# Patient Record
Sex: FEMALE | Race: WHITE | NOT HISPANIC OR LATINO | Employment: FULL TIME | URBAN - METROPOLITAN AREA
[De-identification: names, ages, dates, MRNs, and addresses within clinical notes are randomized per-mention and may not be internally consistent; named-entity substitution may affect disease eponyms.]

---

## 2017-09-14 ENCOUNTER — ALLSCRIPTS OFFICE VISIT (OUTPATIENT)
Dept: OTHER | Facility: OTHER | Age: 37
End: 2017-09-14

## 2017-10-26 NOTE — PROGRESS NOTES
Assessment  1  Rosacea (695 3) (L71 9)   2  Hyperhidrosis (705 21) (L74 964)    Plan   · Drysol 20 % External Solution; APPLY SPARINGLY TO AFFECTED AREA EVERY  NIGHT BEFORE BED   · Finacea 15 % External Foam; APPLY TO FACE DAILY   · Mirvaso 0 33 % External Gel; Apply to face qam   · Oracea 40 MG Oral Capsule Delayed Release; TAKE 1 CAPSULE DAILY EVERY  MORNING BEFORE BREAKFAST   · Follow-up visit in 6 months Evaluation and Treatment  Follow-up  Status: Hold For -  Scheduling  Requested for: [id number]    Discussion/Summary  Discussion Summary- [facility] Derm:   Assessment #1: Rosacea  Care Plan:   Under great control continue same therapy will consider stopping Oracea at her next visit to see if this will continue to be necessary for control continue with Mirvaso gel and Finacea  Assessment #2: Hyperhidrosis  Care Plan:   Under good control continue same therapy  Chief Complaint  Chief Complaint Free Text Note Form: 6 month check up for Rosacea      History of Present Illness  HPI: 60-year-old female with history of both rosacea and hyperhidrosis seen in follow-up  Patient notes both problems are under good control using all the medications as prescribed with good results  No specific concerns noted      Review of Systems  Complete Female Dermatology [facility] Patient:   Constitutional: Denies constitutional symptoms  Eyes: Denies eye symptoms  ENT:  denies ear symptoms, nasal symptoms, mouth or throat symptoms  Cardiovascular: Denies cardiovascular symptoms  Respiratory: Denies respiratory symptoms  Gastrointestinal: Denies gastrointestinal symptoms  Musculoskeletal: Denies musculoskeletal symptoms  Integumentary: Denies skin, hair and nail symptoms  Neurological: Denies neurologic symptoms  Psychiatric: Denies psychiatric symptoms  Endocrine: Denies endocrine symptoms  Hematologic/Lymphatic: Denies hematologic symptoms  Active Problems  1   Hyperhidrosis (705 21) (L01 747) 2  Rosacea (695 3) (L71 9)   3  Screening for skin condition (V82 0) (Z13 89)    Past Medical History  Past Medical History Reviewed- Derm:   The past medical history was reviewed  Surgical History  1  History of  Section  Surgical History Reviewed [address] [place] [address] [address]- Derm:   Surgical History reviewed      Family History  Father    1  Family history of transient ischemic attacks (V17 1) (Z82 3)  Family History Reviewed- Derm:   Family History was reviewed      Social History   · Never a smoker  Social History Reviewed [facility]- Derm: The social history was reviewed      Current Meds   1  Drysol 20 % External Solution; APPLY SPARINGLY TO AFFECTED AREA EVERY NIGHT   BEFORE BED; Therapy: 22GFA4528 to (Last Rx:2016)  Requested for: 2016 Ordered   2  Finacea 15 % External Foam; APPLY TO FACE DAILY; Therapy: 07LIP1622 to (Last Rx:33Hjh1569)  Requested for: 46Kog8577 Ordered   3  MetroNIDAZOLE 1 % External Gel; apply to affected area sparingly once daily; Therapy: 86VSU8371 to (Last MT:[id number])  Requested for: 81HDB8697 Ordered   4  Mirvaso 0 33 % External Gel; Apply to face qam  Requested for: 01Nig2986; Last   Rx:47Hkt1136 Ordered   5  Mupirocin 2 % External Ointment; Therapy: (Elsie Victor to Recorded   6  Oracea 40 MG Oral Capsule Delayed Release; TAKE 1 CAPSULE DAILY EVERY   MORNING BEFORE BREAKFAST; Therapy: 01XXD2250 to (Evaluate:2017)  Requested for: 32Yuo8264; Last   Rx:[id number] Ordered  Medication List Reviewed: The medication list was reviewed and updated today  Allergies  1  morphine    Physical Exam    Constitutional   General appearance: Appears healthy and well developed  Lymphatic   No visible disturbance  Musculoskeletal   Digits and nails: No clubbing, cyanosis or edema  Cutaneous and nail exam normal     Skin   Scalp skin texture and hair distribution: Normal skin texture on scalp, normal hair distribution  Head: Normal turgor, no rashes, no lesions  Neuro/Psych   Alert and oriented x 3  Displays comfort and cooperation during encounterl  Affect is normal     Finding Minimal erythema no papules marked improvement noted  Future Appointments    Date/Time Provider Specialty Site   03/16/2018 08:00 AM FAMILIA Rosado   Dermatology [facility]     Signatures   Electronically signed by : FAMILIA Soliman ; Sep 14 2017  8:53AM EST                       (Author)

## 2018-01-17 NOTE — PROGRESS NOTES
Assessment    1  Rosacea (695 3) (L71 9)   2  Screening for skin condition (V82 0) (Z13 89)   3  Hyperhidrosis (705 21) (L74 519)    Plan    · Drysol 20 % External Solution; APPLY SPARINGLY TO AFFECTED AREA EVERY  NIGHT BEFORE BED    · MetroNIDAZOLE 1 % External Gel; apply to affected area sparingly once daily   · Oracea 40 MG Oral Capsule Delayed Release; TAKE 1 CAPSULE DAILY EVERY  MORNING BEFORE BREAKFAST   · From  Mirvaso 0 33 % External Gel  To Mirvaso 0 33 % External Gel Apply to  face qam   · Follow-up visit in 3 months Evaluation and Treatment  Follow-up  Status: Complete   Done: 09DQN8800    Discussion/Summary  Discussion Summary- [facility] Derm:   Assessment #1: Rosacea  Care Plan: With no improvement noted with Finacea we switch patient to use the MetroGel along with her Mirvaso gel on a more consistent basis  We also will add Oracea to her regimen reevaluate in 3 months also consider referral for laser treatment  Assessment #2: Hyperhidrosis  Care Plan:   We discussed the use of aluminum chloride prescription given  Chief Complaint  Chief Complaint Free Text Note Form: ROSACEA TREATMENT FUP  C/O EXCESSIVE SWEATING UNDER BOTH ARMS  History of Present Illness  HPI: 43-year-old female seen in followup secondary to rosacea as well as concerns regarding hyperhidrosis of her axilla which is been a problem for a while  Patient has not responded to any topical therapies over-the-counter  Her rosacea has not improved with use of Finacea on a consistent basis over the last 3 months      Review of Systems  Complete Female Dermatology [facility] Patient:   Constitutional: Denies constitutional symptoms  Eyes: Denies eye symptoms  ENT:  denies ear symptoms, nasal symptoms, mouth or throat symptoms  Cardiovascular: Denies cardiovascular symptoms  Respiratory: Denies respiratory symptoms  Gastrointestinal: Denies gastrointestinal symptoms     Musculoskeletal: Denies musculoskeletal symptoms  Integumentary: Denies skin, hair and nail symptoms  Neurological: Denies neurologic symptoms  Psychiatric: Denies psychiatric symptoms  Endocrine: Denies endocrine symptoms  Hematologic/Lymphatic: Denies hematologic symptoms  Active Problems    1  Rosacea (695 3) (L71 9)   2  Screening for skin condition (V82 0) (Z13 89)    Past Medical History  Past Medical History Reviewed- Derm:   The past medical history was reviewed  Surgical History    1  History of  Section  Surgical History Reviewed [address] [place] [address] 14- Derm:   Surgical History reviewed      Family History    1  Family history of transient ischemic attacks (V17 1) (Z82 3)  Family History Reviewed- Derm:   Family History was reviewed      Social History    · Never a smoker  Social History Reviewed [facility]- Derm: The social history was reviewed      Current Meds   1  Mirvaso 0 33 % External Gel Recorded   2  Mupirocin 2 % External Ointment; Therapy: (Recorded:77Ufc8498) to Recorded  Medication List Reviewed: The medication list was reviewed and updated today  Allergies    1  morphine    Physical Exam    Constitutional   General appearance: Appears healthy and well developed  Lymphatic   No visible disturbance  Musculoskeletal   Digits and nails: No clubbing, cyanosis or edema  Cutaneous and nail exam normal     Skin   Scalp skin texture and hair distribution: Normal skin texture on scalp, normal hair distribution  Head: Abnormal     Neuro/Psych   Alert and oriented x 3  Displays comfort and cooperation during encounterl  Affect is normal     Finding No apparent hyperhidrosis noted erythema papules pustules noted as previously and improvement noted  Future Appointments    Date/Time Provider Specialty Site   2016 04:05 PM FAMILIA Benton   Dermatology [facility]     Signatures   Electronically signed by : FAMILIA Mata ; 2016  5:39PM EST (Author)

## 2018-01-18 ENCOUNTER — GENERIC CONVERSION - ENCOUNTER (OUTPATIENT)
Dept: OTHER | Facility: OTHER | Age: 38
End: 2018-01-18

## 2018-02-18 DIAGNOSIS — L71.9 ROSACEA: Primary | ICD-10-CM

## 2018-03-19 ENCOUNTER — TELEPHONE (OUTPATIENT)
Dept: INTERNAL MEDICINE CLINIC | Facility: CLINIC | Age: 38
End: 2018-03-19

## 2018-03-19 DIAGNOSIS — L71.9 ROSACEA: Primary | ICD-10-CM

## 2018-03-19 NOTE — TELEPHONE ENCOUNTER
Pt resched her appt to 03/29, 1 week later  Will run out of [facility]    Would like a 1 month rx sent to [facility] french     Pt would like a call back upon completion

## 2018-03-28 RX ORDER — METRONIDAZOLE 10 MG/G
GEL TOPICAL
COMMUNITY
Start: 2016-01-26 | End: 2018-03-29 | Stop reason: ALTCHOICE

## 2018-03-29 ENCOUNTER — OFFICE VISIT (OUTPATIENT)
Dept: DERMATOLOGY | Facility: CLINIC | Age: 38
End: 2018-03-29
Payer: COMMERCIAL

## 2018-03-29 DIAGNOSIS — L71.9 ROSACEA: Primary | ICD-10-CM

## 2018-03-29 DIAGNOSIS — R61 HYPERHIDROSIS: ICD-10-CM

## 2018-03-29 PROCEDURE — 99213 OFFICE O/P EST LOW 20 MIN: CPT | Performed by: DERMATOLOGY

## 2018-03-29 NOTE — PATIENT INSTRUCTIONS
Rosacea under good control again advised patient to consider discontinue doxycycline continue topicals   hyperhidrosis under control  Continue same therapy

## 2018-03-29 NOTE — PROGRESS NOTES
[address] [facility]  [address] [address]     MRN: [id number] : 1980  Encounter: [id number]  Patient Information: Yordan Pritchett  Chief complaint: follow-up for rosacea and hyperhidrosis    History of present illness:  64-year-old female presents for follow-up for both rosacea and hyperhidrosis doing well for both conditions  Patient notably is reluctant to stop her doxycycline because it for fear of this recurring  At present no problems noted hyperhidrosis under good control  No past medical history on file  No past surgical history on file  Social History   History   Alcohol use Not on file     History   Drug use: Unknown     History   Smoking Status    Not on file   Smokeless Tobacco    Not on file     No family history on file  Meds/Allergies   Allergies   Allergen Reactions    Morphine        Meds:  Prior to Admission medications    Medication Sig Start Date End Date Taking?  Authorizing Provider   aluminum chloride (DRYSOL) 20 % external solution Apply topically 16  Yes Historical Provider, MD   Azelaic Acid (FINACEA) 15 % FOAM Apply topically daily 16  Yes Historical Provider, MD   Brimonidine Tartrate (MIRVASO) 0 33 % GEL Apply topically   Yes Historical Provider, MD   ORACEA 40 MG capsule Take 1 capsule (40 mg total) by mouth daily before breakfast 3/19/18  Yes Jan Mendez MD   metroNIDAZOLE (METROGEL) 1 % gel Apply topically 16   Historical Provider, MD   Mupirocin 2 % KIT Apply topically    Historical Provider, MD       Subjective:     Review of Systems:    General: negative for - chills, fatigue, fever,  weight gain or weight loss  Psychological: negative for - anxiety, behavioral disorder, concentration difficulties, decreased libido, depression, irritability, memory difficulties, mood swings, sleep disturbances or suicidal ideation  ENT: negative for - hearing difficulties , nasal congestion, nasal discharge, oral lesions, sinus pain, sneezing, sore throat  Allergy and Immunology: negative for - hives, insect bite sensitivity,  Hematological and Lymphatic: negative for - bleeding problems, blood clots,bruising, swollen lymph nodes  Endocrine: negative for - hair pattern changes, hot flashes, malaise/lethargy, mood swings, palpitations, polydipsia/polyuria, skin changes, temperature intolerance or unexpected weight change  Respiratory: negative for - cough, hemoptysis, orthopnea, shortness of breath, or wheezing  Cardiovascular: negative for - chest pain, dyspnea on exertion, edema,  Gastrointestinal: negative for - abdominal pain, nausea/vomiting  Genito-Urinary: negative for - dysuria, incontinence, irregular/heavy menses or urinary frequency/urgency  Musculoskeletal: negative for - gait disturbance, joint pain, joint stiffness, joint swelling, muscle pain, muscular weakness  Dermatological:  As in HPI  Neurological: negative for confusion, dizziness, headaches, impaired coordination/balance, memory loss, numbness/tingling, seizures, speech problems, tremors or weakness       Objective: There were no vitals taken for this visit  Physical Exam:    General Appearance:    Alert, cooperative, no distress   Head:    Normocephalic, without obvious abnormality, atraumatic           Skin:   A full skin exam was performed including scalp, head scalp, eyes, ears, nose, lips, erythema diminished on the face no papules pustules     Assessment:     1  Hyperhidrosis     2   Rosacea           Plan:   Rosacea under good control again advised patient to consider discontinue doxycycline continue topicals   hyperhidrosis under control  Continue same therapy    Leny Grande MD  3/29/2018,8:37 AM    Portions of the record may have been created with voice recognition software   Occasional wrong word or "sound a like" substitutions may have occurred due to the inherent limitations of voice recognition software   Read the chart carefully and recognize, using context, where substitutions have occurred

## 2018-05-22 DIAGNOSIS — L71.9 ROSACEA: ICD-10-CM

## 2018-06-18 DIAGNOSIS — L71.9 ROSACEA: ICD-10-CM

## 2018-10-01 ENCOUNTER — OFFICE VISIT (OUTPATIENT)
Dept: DERMATOLOGY | Facility: CLINIC | Age: 38
End: 2018-10-01
Payer: COMMERCIAL

## 2018-10-01 DIAGNOSIS — R61 HYPERHIDROSIS: Primary | ICD-10-CM

## 2018-10-01 DIAGNOSIS — L71.9 ROSACEA: ICD-10-CM

## 2018-10-01 PROCEDURE — 99213 OFFICE O/P EST LOW 20 MIN: CPT | Performed by: DERMATOLOGY

## 2018-10-01 RX ORDER — DOXYCYCLINE 50 MG/1
50 CAPSULE ORAL 2 TIMES DAILY
COMMUNITY
End: 2019-10-07 | Stop reason: SDUPTHER

## 2018-10-01 NOTE — PROGRESS NOTES
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  Authorizing Provider   aluminum chloride (DRYSOL) 20 % external solution Apply topically daily at bedtime as needed (as needed for hyperhidrosis) 3/29/18  Yes Angel Patel MD   Azelaic Acid (FINACEA) 15 % FOAM Apply 1 application topically daily 3/29/18  Yes Angel Patel MD   Brimonidine Tartrate (MIRVASO) 0 33 % GEL Apply 1 application topically daily 3/29/18  Yes Angel Patel MD   doxycycline monohydrate (MONODOX) 50 mg capsule Take 50 mg by mouth 2 (two) times a day   Yes Historical Provider, MD   Mupirocin 2 % KIT Apply topically   Yes Historical Provider, MD   ORACEA 40 MG capsule TAKE ONE CAPSULE BY MOUTH EVERY DAY BEFORE BREAKFAST 18  Yes Angel Patel MD       Subjective:     Review of Systems:    General: negative for - chills, fatigue, fever,  weight gain or weight loss  Psychological: negative for - anxiety, behavioral disorder, concentration difficulties, decreased libido, depression, irritability, memory difficulties, mood swings, sleep disturbances or suicidal ideation  ENT: negative for - hearing difficulties , nasal congestion, nasal discharge, oral lesions, sinus pain, sneezing, sore throat  Allergy and Immunology: negative for - hives, insect bite sensitivity,  Hematological and Lymphatic: negative for - bleeding problems, blood clots,bruising, swollen lymph nodes  Endocrine: negative for - hair pattern changes, hot flashes, malaise/lethargy, mood swings, palpitations, polydipsia/polyuria, skin changes, temperature intolerance or unexpected weight change  Respiratory: negative for - cough, hemoptysis, orthopnea, shortness of breath, or wheezing  Cardiovascular: negative for - chest pain, dyspnea on exertion, edema,  Gastrointestinal: negative for - abdominal pain, nausea/vomiting  Genito-Urinary: negative for - dysuria, incontinence, irregular/heavy menses or urinary frequency/urgency  Musculoskeletal: negative for - gait disturbance, joint pain, joint stiffness, joint swelling, muscle pain, muscular weakness  Dermatological:  As in HPI  Neurological: negative for confusion, dizziness, headaches, impaired coordination/balance, memory loss, numbness/tingling, seizures, speech problems, tremors or weakness       Objective: There were no vitals taken for this visit  Physical Exam:    General Appearance:    Alert, cooperative, no distress   Head:    Normocephalic, without obvious abnormality, atraumatic           Skin:   A full skin exam was performed including scalp, head scalp, eyes, ears, nose, lips, neck, chest, axilla, abdomen, back, buttocks, bilateral upper extremities, bilateral lower extremities, hands, feet, fingers, toes, fingernails, and toenails   Minimal erythema on the face one  papules     Assessment:     1   Hyperhidrosis  aluminum chloride (DRYSOL) 20 % external solution   2  Rosacea           Plan:    rosacea will continue with the doxycycline and Finacea and Bogdan Trevino go ahead have the ophthalmologist control the treatment of her ophthalmological  Process   hyperhidrosis refilled her  Drysol and plan follow-up again in a year  Adry Huizar MD  10/1/2018,8:49 AM    Portions of the record may have been created with voice recognition software   Occasional wrong word or "sound a like" substitutions may have occurred due to the inherent limitations of voice recognition software   Read the chart carefully and recognize, using context, where substitutions have occurred

## 2018-10-01 NOTE — PATIENT INSTRUCTIONS
rosacea will continue with the doxycycline and Finacea and Jeannine Chase go ahead have the ophthalmologist control the treatment of her ophthalmological  Process   hyperhidrosis refilled her  Drysol and plan follow-up again in a year

## 2018-12-03 ENCOUNTER — TELEPHONE (OUTPATIENT)
Dept: DERMATOLOGY | Facility: CLINIC | Age: 38
End: 2018-12-03

## 2019-06-28 DIAGNOSIS — L71.9 ROSACEA: ICD-10-CM

## 2019-06-28 RX ORDER — AZELAIC ACID 0.15 G/G
AEROSOL, FOAM TOPICAL
Qty: 50 G | Refills: 4 | Status: SHIPPED | OUTPATIENT
Start: 2019-06-28 | End: 2020-10-12 | Stop reason: ALTCHOICE

## 2019-07-03 ENCOUNTER — TELEPHONE (OUTPATIENT)
Dept: DERMATOLOGY | Facility: CLINIC | Age: 39
End: 2019-07-03

## 2019-10-07 ENCOUNTER — OFFICE VISIT (OUTPATIENT)
Dept: DERMATOLOGY | Facility: CLINIC | Age: 39
End: 2019-10-07
Payer: COMMERCIAL

## 2019-10-07 DIAGNOSIS — L71.9 ROSACEA: Primary | ICD-10-CM

## 2019-10-07 DIAGNOSIS — R61 HYPERHIDROSIS: ICD-10-CM

## 2019-10-07 PROCEDURE — 99213 OFFICE O/P EST LOW 20 MIN: CPT | Performed by: DERMATOLOGY

## 2019-10-07 RX ORDER — AZELAIC ACID 0.15 G/G
1 GEL TOPICAL DAILY
Qty: 50 G | Refills: 4 | Status: SHIPPED | OUTPATIENT
Start: 2019-10-07 | End: 2020-10-12 | Stop reason: ALTCHOICE

## 2019-10-07 RX ORDER — DOXYCYCLINE 50 MG/1
50 CAPSULE ORAL DAILY
Qty: 90 CAPSULE | Refills: 3 | Status: SHIPPED | OUTPATIENT
Start: 2019-10-07 | End: 2020-10-06

## 2019-10-07 NOTE — PATIENT INSTRUCTIONS
Rosacea under good control continue same therapy  Hyperhidrosis under good control continue same therapy

## 2019-10-07 NOTE — PROGRESS NOTES
[address]  [address] [address]  [phone]  [phone]     MRN: [id number] : 1980  Encounter: [id number]  Patient Information: Jayjay Koehler  Chief complaint: yearly check up for rosacea and hyperhidrosis    History of present illness:  41-year-old female presents for follow-up for both rosacea hyperhidrosis patient notes that her rosacea has been under control however she had a switch to the gel from the phone because of availability her hyperhidrosis is still also under control with use of aluminum chloride  Patient has not been able to get off of the doxycycline because of continued issues with her blepharitis  No other concerns noted  No past medical history on file  No past surgical history on file  Social History   Social History     Substance and Sexual Activity   Alcohol Use Not on file     Social History     Substance and Sexual Activity   Drug Use Not on file     Social History     Tobacco Use   Smoking Status Never Smoker   Smokeless Tobacco Never Used     No family history on file  Meds/Allergies   Allergies   Allergen Reactions    Morphine        Meds:  Prior to Admission medications    Medication Sig Start Date End Date Taking?  Authorizing Provider   aluminum chloride (DRYSOL) 20 % external solution Apply topically daily at bedtime as needed (as needed for hyperhidrosis) 10/1/18  Yes Chuy Fajardo MD   Brimonidine Tartrate (MIRVASO) 0 33 % GEL Apply 1 application topically daily 3/29/18  Yes Chuy Fajardo MD   doxycycline monohydrate (MONODOX) 50 mg capsule Take 50 mg by mouth 2 (two) times a day   Yes Historical Provider, MD   FINACEA 15 % FOAM USE 1 APPLICATION TOPICALLY DAILY 19  Yes Chuy Fajardo MD   Mupirocin 2 % KIT Apply topically   Yes Historical Provider, MD   ORACEA 40 MG capsule TAKE ONE CAPSULE BY MOUTH EVERY DAY BEFORE BREAKFAST  Patient not taking: Reported on 10/7/2019 5/22/18   Ashley José MD Shane       Subjective:     Review of Systems:    General: negative for - chills, fatigue, fever,  weight gain or weight loss  Psychological: negative for - anxiety, behavioral disorder, concentration difficulties, decreased libido, depression, irritability, memory difficulties, mood swings, sleep disturbances or suicidal ideation  ENT: negative for - hearing difficulties , nasal congestion, nasal discharge, oral lesions, sinus pain, sneezing, sore throat  Allergy and Immunology: negative for - hives, insect bite sensitivity,  Hematological and Lymphatic: negative for - bleeding problems, blood clots,bruising, swollen lymph nodes  Endocrine: negative for - hair pattern changes, hot flashes, malaise/lethargy, mood swings, palpitations, polydipsia/polyuria, skin changes, temperature intolerance or unexpected weight change  Respiratory: negative for - cough, hemoptysis, orthopnea, shortness of breath, or wheezing  Cardiovascular: negative for - chest pain, dyspnea on exertion, edema,  Gastrointestinal: negative for - abdominal pain, nausea/vomiting  Genito-Urinary: negative for - dysuria, incontinence, irregular/heavy menses or urinary frequency/urgency  Musculoskeletal: negative for - gait disturbance, joint pain, joint stiffness, joint swelling, muscle pain, muscular weakness  Dermatological:  As in HPI  Neurological: negative for confusion, dizziness, headaches, impaired coordination/balance, memory loss, numbness/tingling, seizures, speech problems, tremors or weakness       Objective: There were no vitals taken for this visit      Physical Exam:    General Appearance:    Alert, cooperative, no distress   Head:    Normocephalic, without obvious abnormality, atraumatic           Skin:   A full skin exam was performed including scalp, head scalp, eyes, ears, nose, lips, neck, chest, axilla, abdomen, back, buttocks, bilateral upper extremities, bilateral lower extremities, hands, feet, fingers, toes, fingernails, and toenails normal pigmented lesions regular shape and color no active rosacea noted     Assessment:     1  Rosacea     2  Hyperhidrosis           Plan:   Rosacea under good control continue same therapy  Hyperhidrosis under good control continue same therapy    Drew Wilson MD  10/7/2019,8:35 AM    Portions of the record may have been created with voice recognition software   Occasional wrong word or "sound a like" substitutions may have occurred due to the inherent limitations of voice recognition software   Read the chart carefully and recognize, using context, where substitutions have occurred

## 2020-10-12 ENCOUNTER — OFFICE VISIT (OUTPATIENT)
Dept: DERMATOLOGY | Facility: CLINIC | Age: 40
End: 2020-10-12
Payer: COMMERCIAL

## 2020-10-12 VITALS — TEMPERATURE: 96.8 F

## 2020-10-12 DIAGNOSIS — L71.9 ROSACEA: Primary | ICD-10-CM

## 2020-10-12 PROCEDURE — 99213 OFFICE O/P EST LOW 20 MIN: CPT | Performed by: DERMATOLOGY

## 2020-10-12 RX ORDER — DOXYCYCLINE HYCLATE 50 MG/1
50 CAPSULE ORAL DAILY
COMMUNITY
End: 2020-10-12 | Stop reason: SDUPTHER

## 2020-10-12 RX ORDER — BRIMONIDINE TARTRATE 5 MG/G
1 GEL TOPICAL DAILY
Qty: 1 TUBE | Refills: 4 | Status: SHIPPED | OUTPATIENT
Start: 2020-10-12 | End: 2021-12-23 | Stop reason: SDUPTHER

## 2020-10-12 RX ORDER — AZELAIC ACID 0.15 G/G
1 GEL TOPICAL DAILY
Qty: 50 G | Refills: 4 | Status: SHIPPED | OUTPATIENT
Start: 2020-10-12 | End: 2021-12-23 | Stop reason: SDUPTHER

## 2020-10-12 RX ORDER — DOXYCYCLINE HYCLATE 50 MG/1
50 CAPSULE ORAL DAILY
Qty: 90 CAPSULE | Refills: 3 | Status: SHIPPED | OUTPATIENT
Start: 2020-10-12 | End: 2020-11-09 | Stop reason: ALTCHOICE

## 2020-11-09 DIAGNOSIS — L71.9 ROSACEA: Primary | ICD-10-CM

## 2020-11-09 RX ORDER — DOXYCYCLINE 50 MG/1
CAPSULE ORAL
Qty: 30 CAPSULE | Refills: 11 | Status: SHIPPED | OUTPATIENT
Start: 2020-11-09 | End: 2021-11-24

## 2021-12-17 DIAGNOSIS — L71.9 ROSACEA: ICD-10-CM

## 2021-12-17 RX ORDER — DOXYCYCLINE 50 MG/1
CAPSULE ORAL
Qty: 30 CAPSULE | Refills: 0 | Status: SHIPPED | OUTPATIENT
Start: 2021-12-17 | End: 2021-12-23 | Stop reason: SDUPTHER

## 2021-12-23 ENCOUNTER — OFFICE VISIT (OUTPATIENT)
Dept: DERMATOLOGY | Facility: CLINIC | Age: 41
End: 2021-12-23
Payer: COMMERCIAL

## 2021-12-23 VITALS — WEIGHT: 186 LBS | TEMPERATURE: 96.6 F

## 2021-12-23 DIAGNOSIS — L71.9 ROSACEA: ICD-10-CM

## 2021-12-23 PROCEDURE — 99213 OFFICE O/P EST LOW 20 MIN: CPT | Performed by: DERMATOLOGY

## 2021-12-23 RX ORDER — DOXYCYCLINE 50 MG/1
50 CAPSULE ORAL DAILY
Qty: 90 CAPSULE | Refills: 3 | Status: SHIPPED | OUTPATIENT
Start: 2021-12-23 | End: 2022-12-18

## 2021-12-23 RX ORDER — BRIMONIDINE TARTRATE 5 MG/G
1 GEL TOPICAL DAILY
Qty: 30 G | Refills: 4 | Status: SHIPPED | OUTPATIENT
Start: 2021-12-23

## 2021-12-23 RX ORDER — AZELAIC ACID 0.15 G/G
1 GEL TOPICAL DAILY
Qty: 50 G | Refills: 4 | Status: SHIPPED | OUTPATIENT
Start: 2021-12-23

## 2022-12-29 ENCOUNTER — OFFICE VISIT (OUTPATIENT)
Dept: DERMATOLOGY | Facility: CLINIC | Age: 42
End: 2022-12-29

## 2022-12-29 VITALS — HEIGHT: 67 IN | WEIGHT: 170 LBS | BODY MASS INDEX: 26.68 KG/M2

## 2022-12-29 DIAGNOSIS — L71.9 ROSACEA: Primary | ICD-10-CM

## 2022-12-29 RX ORDER — DOXYCYCLINE 50 MG/1
50 CAPSULE ORAL DAILY
Qty: 90 CAPSULE | Refills: 3 | Status: SHIPPED | OUTPATIENT
Start: 2022-12-29 | End: 2023-12-24

## 2022-12-29 RX ORDER — LEVOTHYROXINE SODIUM 0.03 MG/1
25 TABLET ORAL DAILY
COMMUNITY

## 2022-12-29 RX ORDER — AZELAIC ACID 0.15 G/G
1 GEL TOPICAL DAILY
Qty: 50 G | Refills: 4 | Status: SHIPPED | OUTPATIENT
Start: 2022-12-29

## 2022-12-29 RX ORDER — BRIMONIDINE TARTRATE 5 MG/G
1 GEL TOPICAL DAILY
Qty: 30 G | Refills: 4 | Status: SHIPPED | OUTPATIENT
Start: 2022-12-29

## 2022-12-29 NOTE — PROGRESS NOTES
[facility] DERMATOLOGY  [address] [place] [id number]  [phone]  [phone]     MRN: [id number] : 1980  Encounter: [id number]  Patient Information: Dino Frost  Chief complaint: Yearly checkup    History of present illness: 41-year-old female with known history of rosacea and previous history of hyperhidrosis which no longer is an issue presents for follow-up patient happy with the way things are going controlling this with the low-dose doxycycline long with Mirvaso and azelaic acid no specific concerns at this time patient has tried to decrease her doxycycline but notes grittiness of the eye when she does this  No past medical history on file  No past surgical history on file  Social History   Social History     Substance and Sexual Activity   Alcohol Use None     Social History     Substance and Sexual Activity   Drug Use Not on file     Social History     Tobacco Use   Smoking Status Never   Smokeless Tobacco Never     No family history on file  Meds/Allergies   Allergies   Allergen Reactions   • Morphine        Meds:  Prior to Admission medications    Medication Sig Start Date End Date Taking?  Authorizing Provider   Azelaic Acid 15 % cream Apply 1 application topically daily 21  Yes Cathie Whelan MD   Brimonidine Tartrate (Mirvaso) 0 33 % GEL Apply 1 application topically daily 21  Yes Cathie Whelan MD   levothyroxine 25 mcg tablet Take 25 mcg by mouth daily   Yes Historical Provider, MD   rivaroxaban (Xarelto) 20 mg tablet Take 20 mg by mouth   Yes Historical Provider, MD   Mupirocin 2 % KIT Apply topically  Patient not taking: Reported on 2021    Historical Provider, MD       Subjective:     Review of Systems:    General: negative for - chills, fatigue, fever,  weight gain or weight loss  Psychological: negative for - anxiety, behavioral disorder, concentration difficulties, decreased libido, depression, irritability, memory difficulties, mood swings, sleep disturbances or suicidal ideation  ENT: negative for - hearing difficulties , nasal congestion, nasal discharge, oral lesions, sinus pain, sneezing, sore throat  Allergy and Immunology: negative for - hives, insect bite sensitivity,  Hematological and Lymphatic: negative for - bleeding problems, blood clots,bruising, swollen lymph nodes  Endocrine: negative for - hair pattern changes, hot flashes, malaise/lethargy, mood swings, palpitations, polydipsia/polyuria, skin changes, temperature intolerance or unexpected weight change  Respiratory: negative for - cough, hemoptysis, orthopnea, shortness of breath, or wheezing  Cardiovascular: negative for - chest pain, dyspnea on exertion, edema,  Gastrointestinal: negative for - abdominal pain, nausea/vomiting  Genito-Urinary: negative for - dysuria, incontinence, irregular/heavy menses or urinary frequency/urgency  Musculoskeletal: negative for - gait disturbance, joint pain, joint stiffness, joint swelling, muscle pain, muscular weakness  Dermatological:  As in HPI  Neurological: negative for confusion, dizziness, headaches, impaired coordination/balance, memory loss, numbness/tingling, seizures, speech problems, tremors or weakness       Objective:   Ht 5' 7" (1 702 m)   Wt 77 1 kg (170 lb)   BMI 26 63 kg/m²     Physical Exam:    General Appearance:    Alert, cooperative, no distress   Head:    Normocephalic, without obvious abnormality, atraumatic           Skin:   A partial skin exam was performed revealing no active rosacea at this time marked improvement     Assessment:     1   Rosacea  Azelaic Acid 15 % cream    Brimonidine Tartrate (Mirvaso) 0 33 % GEL    doxycycline monohydrate (MONODOX) 50 mg capsule            Plan:   Patient under great control continue same therapy follow-up in 1 year    Malik Kiran MD  12/29/2022,9:22 AM    Portions of the record may have been created with voice recognition software   Occasional wrong word or "sound a like" substitutions may have occurred due to the inherent limitations of voice recognition software   Read the chart carefully and recognize, using context, where substitutions have occurred

## 2022-12-29 NOTE — PROGRESS NOTES
[facility] Note     Patient Name: Ulus Apgar  Encounter Date: 2022     Have you been cared for by a [place] [address] Dermatologist in the last 3 years and, if so, which description applies to you? Yes  I have been here within the last 3 years, and my medical history has NOT changed since that time  I am FEMALE/of child-bearing potential     REVIEW OF SYSTEMS:  Have you recently had or currently have any of the following? · No changes in my recent health  PAST MEDICAL HISTORY:  Have you personally ever had or currently have any of the following? If "YES," then please provide more detail  · No changes in my medical history  FAMILY HISTORY:  Any "first degree relatives" (parent, brother, sister, or child) with the following? • No changes in my family's known health  PATIENT EXPERIENCE:    • Do you want the Dermatologist to perform a COMPLETE skin exam today including a clinical examination under the "bra and underwear" areas? NO  • If necessary, do we have your permission to call and leave a detailed message on your Preferred Phone number that includes your specific medical information?   Yes      Allergies   Allergen Reactions   • Morphine       Current Outpatient Medications:   •  Azelaic Acid 15 % cream, Apply 1 application topically daily, Disp: 50 g, Rfl: 4  •  Brimonidine Tartrate (Mirvaso) 0 33 % GEL, Apply 1 application topically daily, Disp: 30 g, Rfl: 4  •  levothyroxine 25 mcg tablet, Take 25 mcg by mouth daily, Disp: , Rfl:   •  rivaroxaban (Xarelto) 20 mg tablet, Take 20 mg by mouth, Disp: , Rfl:   •  Mupirocin 2 % KIT, Apply topically (Patient not taking: Reported on 2021), Disp: , Rfl:           • Whom besides the patient is providing clinical information about today's encounter?   o NO ADDITIONAL HISTORIAN (patient alone provided history)    Physical Exam and Assessment/Plan by Diagnosis:

## 2023-05-18 DIAGNOSIS — L71.9 ROSACEA: ICD-10-CM

## 2023-05-18 RX ORDER — BRIMONIDINE TARTRATE 5 MG/G
1 GEL TOPICAL DAILY
Qty: 30 G | Refills: 4 | Status: SHIPPED | OUTPATIENT
Start: 2023-05-18 | End: 2023-05-24 | Stop reason: SDUPTHER

## 2023-05-24 ENCOUNTER — NURSE TRIAGE (OUTPATIENT)
Dept: OTHER | Facility: OTHER | Age: 43
End: 2023-05-24

## 2023-05-24 DIAGNOSIS — L71.9 ROSACEA: ICD-10-CM

## 2023-05-24 RX ORDER — AZELAIC ACID 0.15 G/G
1 GEL TOPICAL DAILY
Qty: 50 G | Refills: 4 | Status: SHIPPED | OUTPATIENT
Start: 2023-05-24

## 2023-05-24 RX ORDER — BRIMONIDINE 5 MG/G
1 GEL TOPICAL DAILY
Qty: 30 G | Refills: 4 | Status: SHIPPED | OUTPATIENT
Start: 2023-05-24

## 2023-05-24 NOTE — TELEPHONE ENCOUNTER
Regarding: Med refill  ----- Message from Yelena Davey sent at 5/24/2023  7:22 AM EDT -----  Medication Refill Request     Name Azelaic Acid   Dose/Frequency Apply 1 application daily  Quantity 50g  Verified pharmacy   YES  Verified ordering Provider   YES  Does patient have enough for the next 3 days? NO    [id number] [place], [place] - [address]  Nikole [address] [facility] [place] [address]   Phone:  [phone]  Fax:  [phone]   VILMA #:  [id number]

## 2023-05-24 NOTE — TELEPHONE ENCOUNTER
"Patient was informed her refill requests have been sent to the office for approval        Reason for Disposition  • [1] Prescription refill request for NON-ESSENTIAL medicine (i e , no harm to patient if med not taken) AND [2] triager unable to refill per department policy    Answer Assessment - Initial Assessment Questions  1  NAME of MEDICATION: \"What medicine are you calling about? \"      Mirvaso 0 33 % GEL / azelaic acid   2  QUESTION: Jose Luis Ponce is your question? \" (e g , medication refill, side effect)      Patient does not have enough medication for 3 days   3  PRESCRIBING HCP: \"Who prescribed it? \" Reason: if prescribed by specialist, call should be referred to that group        Dermatology    Protocols used: MEDICATION QUESTION CALL-ADULT-    "

## 2024-02-14 ENCOUNTER — TELEPHONE (OUTPATIENT)
Dept: DERMATOLOGY | Facility: CLINIC | Age: 44
End: 2024-02-14

## 2024-02-14 DIAGNOSIS — L71.9 ROSACEA: ICD-10-CM

## 2024-02-14 RX ORDER — BRIMONIDINE 5 MG/G
1 GEL TOPICAL DAILY
Qty: 30 G | Refills: 0 | Status: CANCELLED | OUTPATIENT
Start: 2024-02-14

## 2024-02-14 NOTE — TELEPHONE ENCOUNTER
Nancy requesting Doxycycline 50 mg tablet.    She would like callback from office to know if the medication is going to be sent or denied.        Reason for call:   [x] Refill   [] Prior Auth  [] Other:     Office:   [] PCP/Provider -   [x] Specialty/Provider - Shane    Medication:   doxycycline monohydrate (MONODOX) 50 mg capsule     Dose/Frequency: Take 1 capsule (50 mg total) by mouth in the morning     Quantity: 90    Pharmacy: [facility]     Does the patient have enough for 3 days?   [] Yes   [x] No - Send as HP to POD

## 2024-02-14 NOTE — TELEPHONE ENCOUNTER
Reason for call:   [x] Refill   [] Prior Auth  [] Other:     Office:   [] PCP/Provider -   [x] Specialty/Provider - Shane     Medication:   Brimonidine Tartrate (Mirvaso) 0.33 % GEL     Dose/Frequency: Apply 1 application. topically daily     Quantity: 30 g    Pharmacy: [facility]         Does the patient have enough for 3 days?     Nancy is not sure- it is a little squirt bottle.  [] Yes   [] No - Send as HP to POD

## 2024-02-14 NOTE — TELEPHONE ENCOUNTER
Refill must be reviewed and completed by the office or provider. The refill is unable to be approved by the medication management team.    Off protocol

## 2024-02-15 DIAGNOSIS — L71.9 ROSACEA: ICD-10-CM

## 2024-02-15 RX ORDER — BRIMONIDINE 5 MG/G
1 GEL TOPICAL DAILY
Qty: 30 G | Refills: 4 | Status: SHIPPED | OUTPATIENT
Start: 2024-02-15

## 2024-02-15 NOTE — TELEPHONE ENCOUNTER
Called and left message for patient letting her know she needs to schedule a follow up appointment for rosacea in order to get a refill on the Mirvaso 0.33% gel

## 2024-02-15 NOTE — TELEPHONE ENCOUNTER
Patient requesting a refill to hold her off until her next appt on 3/27. Medication pended, please review and sign off.

## 2024-03-27 ENCOUNTER — OFFICE VISIT (OUTPATIENT)
Age: 44
End: 2024-03-27
Payer: COMMERCIAL

## 2024-03-27 VITALS — TEMPERATURE: 98.8 F | BODY MASS INDEX: 30.29 KG/M2 | WEIGHT: 193 LBS | HEIGHT: 67 IN

## 2024-03-27 DIAGNOSIS — L71.9 ROSACEA: Primary | ICD-10-CM

## 2024-03-27 PROCEDURE — 99213 OFFICE O/P EST LOW 20 MIN: CPT | Performed by: DERMATOLOGY

## 2024-03-27 RX ORDER — BRIMONIDINE TARTRATE 5 MG/G
1 GEL TOPICAL DAILY
Qty: 30 G | Refills: 3 | Status: SHIPPED | OUTPATIENT
Start: 2024-03-27

## 2024-03-27 RX ORDER — DOXYCYCLINE 50 MG/1
50 CAPSULE ORAL DAILY
Qty: 90 CAPSULE | Refills: 3 | Status: SHIPPED | OUTPATIENT
Start: 2024-03-27 | End: 2025-03-22

## 2024-03-27 RX ORDER — DOXYCYCLINE 50 MG/1
50 CAPSULE ORAL DAILY
COMMUNITY
Start: 2024-02-05 | End: 2024-03-27 | Stop reason: SDUPTHER

## 2024-03-27 RX ORDER — AZELAIC ACID 0.15 G/G
1 GEL TOPICAL DAILY
Qty: 50 G | Refills: 4 | Status: SHIPPED | OUTPATIENT
Start: 2024-03-27

## 2024-03-27 NOTE — PATIENT INSTRUCTIONS
ROSACEA    Assessment and Plan:  Based on a thorough discussion of this condition and the management approach to it (including a comprehensive discussion of the known risks, side effects and potential benefits of treatment), the patient (family) agrees to implement the following specific plan:  Continue with same therapy  Follow up in one year.    Rosacea is a chronic rash affecting the mid-face including the nose, cheeks, chin, forehead, and eyelids. The incidence is usually greatest between the ages of 30-60 years and is more common in people with fair skin. Common characteristics include redness, telangiectasias, papules and pustules over affected areas. Rosacea may look similar to acne, but there is a lack of comedones. Occasionally the eyes may also be involved in ocular rosacea. In advanced disease, enlargement of the sebaceous glands in the nose, termed rhinophyma, may be present.     Rosacea results in red spots (papules) and sometimes pustules over the face, but unlike acne there are no blackheads, whiteheads, or cystic nodules. Patients often experience increased facial flushing with prominent blood vessels (erythematotelangiectatic rosacea) and dry, sensitive skin. These symptoms are exacerbated by sun exposure, hot or spicy foods, topical steroids and oil-based facial products.     In ocular rosacea, eyelids may be red and sore due to conjunctivitis, keratitis, and episcleritis. If rhinophyma develops due to enlargement of sebaceous glands, the patient may have an enlarged and irregularly shaped nose with prominent pores. In rosacea that is refractory to treatment, patients can develop persistent redness and swelling of the face due to lymphatic obstruction (Morbihan disease).     Distribution around the cheeks may be confused with the malar or “butterfly rash” of lupus. However, the rash of lupus spares the nasal creases and lacks papules and pustules. If signs of photosensitivity, oral ulcers,  arthritis, and kidney dysfunction are present then consider referral to a rheumatologist.     There are many potential causes of rosacea including genetic, environmental, vascular, and inflammatory factors. These include, but are not limited to:  Chronic exposure to ultraviolet radiation   Increased immune responses in the form of cathelicidins that promote vessel dilation and infiltration with white blood cells (neutrophils) into the dermis  Increased matrix metalloproteinases such as collagen and elastase that remodel normal tissue may contribute to inflammation of the skin making it thicker and harder  There is some evidence to suggest that increased numbers of demodex mites on patient skin may contribute to rosacea papules     General Treatment Approach   Avoid exacerbating factors such as heat, spicy foods, and alcohol   Use daily SPF30+ sunscreen and other methods of coverage for sun protection  Use water-based make-up   Avoid applying topical steroids to affected areas as they can cause perioral dermatitis and exacerbate rosacea     Topical Treatment Approach  Metronidazole cream or gel by itself or in combination with oral antibiotics for more severe cases  Azelaic acid cream or lotion is effective for mild inflammatory rosacea when applied twice daily to affected areas  Brimonidine gel and oxymetazoline hydrochloride cream can reduce facial redness temporarily   Ivermectin cream can treat papulopustular rosacea by controlling demodex mites and inflammation   Pimecrolimus cream or tacrolimus ointment twice a day for 2-3 months can help reduce inflammation    Oral Treatment Approach  Antibiotics such as doxycycline, minocycline, or erythromycin for 1-3 months  Clonidine and carvedilol can help reduce facial flushing and are generally well tolerated. Common side effects include low blood pressure, gastrointestinal upset, dry eyes, blurred vision and low heart rate.   Isotretinoin at low doses can be effective  for long term treatment when antibiotics fail. Side effects may make it unsuitable for some patients.   NSAIDs such as diclofenac can help reduce discomfort and redness in the skin.     Procedural/Surgical Treatment Approach   Vascular lasers or intense pulsed light treatment may be used to treat persistent telangiectasia and papulopustular rosacea  Plastic surgery and carbon dioxide lasers may be used to treat rhinophyma

## 2024-03-27 NOTE — PROGRESS NOTES
"[facility] Dermatology Clinic Note     Patient Name: Nancy Downs  Encounter Date: 03/27/2024     Have you been cared for by a [facility] Dermatologist in the last 3 years and, if so, which description applies to you?    Yes.  I have been here within the last 3 years, and my medical history has NOT changed since that time.  I am FEMALE/of child-bearing potential.    REVIEW OF SYSTEMS:  Have you recently had or currently have any of the following? No changes in my recent health.   PAST MEDICAL HISTORY:  Have you personally ever had or currently have any of the following?  If \"YES,\" then please provide more detail. No changes in my medical history.   HISTORY OF IMMUNOSUPPRESSION: Do you have a history of any of the following:  Systemic Immunosuppression such as Diabetes, Biologic or Immunotherapy, Chemotherapy, Organ Transplantation, Bone Marrow Transplantation?  No     Answering \"YES\" requires the addition of the dotphrase \"IMMUNOSUPPRESSED\" as the first diagnosis of the patient's visit.   FAMILY HISTORY:  Any \"first degree relatives\" (parent, brother, sister, or child) with the following?    No changes in my family's known health.   PATIENT EXPERIENCE:    Do you want the Dermatologist to perform a COMPLETE skin exam today including a clinical examination under the \"bra and underwear\" areas?  NO  If necessary, do we have your permission to call and leave a detailed message on your Preferred Phone number that includes your specific medical information?  Yes      Allergies   Allergen Reactions    Morphine       Current Outpatient Medications:     Azelaic Acid 15 % cream, Apply 1 application. topically daily, Disp: 50 g, Rfl: 4    Brimonidine Tartrate (Mirvaso) 0.33 % GEL, Apply 1 application. topically daily, Disp: 30 g, Rfl: 4    levothyroxine 25 mcg tablet, Take 25 mcg by mouth daily, Disp: , Rfl:     Mupirocin 2 % KIT, Apply topically (Patient not taking: Reported on 12/23/2021), Disp: , Rfl:     rivaroxaban " (Xarelto) 20 mg tablet, Take 20 mg by mouth, Disp: , Rfl:           Whom besides the patient is providing clinical information about today's encounter?   NO ADDITIONAL HISTORIAN (patient alone provided history)    Physical Exam and Assessment/Plan by Diagnosis:    ROSACEA    Physical Exam:  Anatomic Location Affected:  Face   Morphological Description:  occasional papules with erythema     Additional History of Present Condition:  present for the past ten years. Currently on Brimonidine tartrate 0.33 % gel daily, Azelaic 15% cream every morning and Doxycycline 50 mg daily. Patient would like Mirvaso brand name only instead of Brimonidine, states it works better for her rosacea, she is aware that it may not be covered under her insurance plan.     Assessment and Plan:  Based on a thorough discussion of this condition and the management approach to it (including a comprehensive discussion of the known risks, side effects and potential benefits of treatment), the patient (family) agrees to implement the following specific plan:  Continue with same therapy  Follow up in one year    Rosacea is a chronic rash affecting the mid-face including the nose, cheeks, chin, forehead, and eyelids. The incidence is usually greatest between the ages of 30-60 years and is more common in people with fair skin. Common characteristics include redness, telangiectasias, papules and pustules over affected areas. Rosacea may look similar to acne, but there is a lack of comedones. Occasionally the eyes may also be involved in ocular rosacea. In advanced disease, enlargement of the sebaceous glands in the nose, termed rhinophyma, may be present.     Rosacea results in red spots (papules) and sometimes pustules over the face, but unlike acne there are no blackheads, whiteheads, or cystic nodules. Patients often experience increased facial flushing with prominent blood vessels (erythematotelangiectatic rosacea) and dry, sensitive skin. These  symptoms are exacerbated by sun exposure, hot or spicy foods, topical steroids and oil-based facial products.     In ocular rosacea, eyelids may be red and sore due to conjunctivitis, keratitis, and episcleritis. If rhinophyma develops due to enlargement of sebaceous glands, the patient may have an enlarged and irregularly shaped nose with prominent pores. In rosacea that is refractory to treatment, patients can develop persistent redness and swelling of the face due to lymphatic obstruction (Morbihan disease).     Distribution around the cheeks may be confused with the malar or “butterfly rash” of lupus. However, the rash of lupus spares the nasal creases and lacks papules and pustules. If signs of photosensitivity, oral ulcers, arthritis, and kidney dysfunction are present then consider referral to a rheumatologist.     There are many potential causes of rosacea including genetic, environmental, vascular, and inflammatory factors. These include, but are not limited to:  Chronic exposure to ultraviolet radiation   Increased immune responses in the form of cathelicidins that promote vessel dilation and infiltration with white blood cells (neutrophils) into the dermis  Increased matrix metalloproteinases such as collagen and elastase that remodel normal tissue may contribute to inflammation of the skin making it thicker and harder  There is some evidence to suggest that increased numbers of demodex mites on patient skin may contribute to rosacea papules     General Treatment Approach   Avoid exacerbating factors such as heat, spicy foods, and alcohol   Use daily SPF30+ sunscreen and other methods of coverage for sun protection  Use water-based make-up   Avoid applying topical steroids to affected areas as they can cause perioral dermatitis and exacerbate rosacea     Topical Treatment Approach  Metronidazole cream or gel by itself or in combination with oral antibiotics for more severe cases  Azelaic acid cream or  lotion is effective for mild inflammatory rosacea when applied twice daily to affected areas  Brimonidine gel and oxymetazoline hydrochloride cream can reduce facial redness temporarily   Ivermectin cream can treat papulopustular rosacea by controlling demodex mites and inflammation   Pimecrolimus cream or tacrolimus ointment twice a day for 2-3 months can help reduce inflammation    Oral Treatment Approach  Antibiotics such as doxycycline, minocycline, or erythromycin for 1-3 months  Clonidine and carvedilol can help reduce facial flushing and are generally well tolerated. Common side effects include low blood pressure, gastrointestinal upset, dry eyes, blurred vision and low heart rate.   Isotretinoin at low doses can be effective for long term treatment when antibiotics fail. Side effects may make it unsuitable for some patients.   NSAIDs such as diclofenac can help reduce discomfort and redness in the skin.     Procedural/Surgical Treatment Approach   Vascular lasers or intense pulsed light treatment may be used to treat persistent telangiectasia and papulopustular rosacea  Plastic surgery and carbon dioxide lasers may be used to treat rhinophyma     Scribe Attestation      I,:  Suki Yanez am acting as a scribe while in the presence of the attending physician.:       I,:  Av Lynn MD personally performed the services described in this documentation    as scribed in my presence.:           Patient was seen and discussed with Dr. Lynn.   ASHLEY Lay 03/27/24

## 2024-10-18 ENCOUNTER — TELEPHONE (OUTPATIENT)
Age: 44
End: 2024-10-18

## 2024-10-18 NOTE — TELEPHONE ENCOUNTER
Reason for call:   [x] Prior Auth  [] Other:     Caller:  [x] Patient  [] Pharmacy  Name:   Address:   Callback Number:     Medication: Mirvaso 0.33 % GEL     Dose/Frequency: Apply 1 Application topically in the morning     Quantity: 30 g    Ordering Provider:   [] PCP/Provider -   [x] Speciality/Provider - Dermatology

## 2024-10-21 NOTE — TELEPHONE ENCOUNTER
PA for Mirvaso 0.33% gel SUBMITTED     via    [x]CMM-KEY: [id number]  []Surescripts-Case ID #   []Availity-Auth ID # NDC #   []Faxed to plan   []Other website   []Phone call Case ID #     Office notes sent, clinical questions answered. Awaiting determination    Turnaround time for your insurance to make a decision on your Prior Authorization can take 7-21 business days.

## 2024-10-25 NOTE — TELEPHONE ENCOUNTER
PA for Mirvaso 0.33% gel DENIED    Reason:(Screenshot if applicable)        Message sent to office clinical pool Yes    Denial letter scanned into Media Yes    Appeal started No (Provider will need to decide if appeal is warranted and send clinical documentation to Prior Authorization Team for initiation.)    **Please follow up with your patient regarding denial and next steps**

## 2025-03-24 ENCOUNTER — TELEPHONE (OUTPATIENT)
Dept: OTHER | Facility: OTHER | Age: 45
End: 2025-03-24

## 2025-03-24 DIAGNOSIS — L71.9 ROSACEA: ICD-10-CM

## 2025-03-24 RX ORDER — BRIMONIDINE TARTRATE 5 MG/G
GEL TOPICAL
Refills: 3 | OUTPATIENT
Start: 2025-03-24

## 2025-03-25 ENCOUNTER — TELEPHONE (OUTPATIENT)
Age: 45
End: 2025-03-25

## 2025-03-25 NOTE — TELEPHONE ENCOUNTER
Spoke with patient who stated she has used metrogel in the past but had no improvement on it. Patient notes she has used mirvaso for a while and has only ever had improvement with rosacea while on it. Patient did say she had an insurance change back in January of 2025. Patient currently taking Doxy 50 mg once daily & azelaic acid cream but patient isn't seeing as much improvement on medications without being on mirvaso. If an appeal gets started and gets denied patient did say she will pay out of pocket for medication since that's the one medication that has helped her rosacea

## 2025-03-25 NOTE — TELEPHONE ENCOUNTER
According to epic looks like medication was denied. Patient is stating that office stated medication was not necessary, please see telephone encounters for more information. Are there any alternatives we can recommend to patient? Please advise. Thanks!

## 2025-03-25 NOTE — TELEPHONE ENCOUNTER
Patient is calling back stating that she never received a call back to explain why her refill for Mirvaso 0.33 % GEL is not appropriate. Please call patient back at your earliest convenience. Thank you.

## 2025-03-26 NOTE — TELEPHONE ENCOUNTER
This medication is not covered under the patients pharmacy benefits so an appeal will likely be denied. However denial letter states the medication could be covered under medical benefits. The patient should call an check with her insurance.